# Patient Record
Sex: MALE | Race: WHITE | Employment: FULL TIME | URBAN - METROPOLITAN AREA
[De-identification: names, ages, dates, MRNs, and addresses within clinical notes are randomized per-mention and may not be internally consistent; named-entity substitution may affect disease eponyms.]

---

## 2018-06-23 ENCOUNTER — HOSPITAL ENCOUNTER (EMERGENCY)
Age: 28
Discharge: HOME | End: 2018-06-23
Payer: SELF-PAY

## 2018-06-23 VITALS
DIASTOLIC BLOOD PRESSURE: 76 MMHG | OXYGEN SATURATION: 99 % | SYSTOLIC BLOOD PRESSURE: 125 MMHG | WEIGHT: 150 LBS | HEART RATE: 73 BPM | TEMPERATURE: 98.1 F | HEIGHT: 71 IN | RESPIRATION RATE: 16 BRPM | BODY MASS INDEX: 21 KG/M2

## 2018-06-23 DIAGNOSIS — S61.213A LACERATION OF LEFT MIDDLE FINGER WITHOUT FOREIGN BODY WITHOUT DAMAGE TO NAIL, INITIAL ENCOUNTER: Primary | ICD-10-CM

## 2018-06-23 PROCEDURE — 77030002986 HC SUT PROL J&J -A

## 2018-06-23 PROCEDURE — 90471 IMMUNIZATION ADMIN: CPT

## 2018-06-23 PROCEDURE — 77030002916 HC SUT ETHLN J&J -A

## 2018-06-23 PROCEDURE — 99282 EMERGENCY DEPT VISIT SF MDM: CPT

## 2018-06-23 PROCEDURE — 75810000293 HC SIMP/SUPERF WND  RPR

## 2018-06-23 PROCEDURE — 90715 TDAP VACCINE 7 YRS/> IM: CPT

## 2018-06-23 PROCEDURE — 74011250636 HC RX REV CODE- 250/636

## 2018-06-23 RX ADMIN — TETANUS TOXOID, REDUCED DIPHTHERIA TOXOID AND ACELLULAR PERTUSSIS VACCINE, ADSORBED 0.5 ML: 5; 2.5; 8; 8; 2.5 SUSPENSION INTRAMUSCULAR at 03:11

## 2018-06-23 NOTE — DISCHARGE INSTRUCTIONS
Cuts: Care Instructions  Your Care Instructions  A cut can happen anywhere on your body. Stitches, staples, skin adhesives, or pieces of tape called Steri-Strips are sometimes used to keep the edges of a cut together and help it heal. Steri-Strips can be used by themselves or with stitches or staples. Sometimes cuts are left open. If the cut went deep and through the skin, the doctor may have closed the cut in two layers. A deeper layer of stitches brings the deep part of the cut together. These stitches will dissolve and don't need to be removed. The upper layer closure, which could be stitches, staples, Steri-Strips, or adhesive, is what you see on the cut. A cut is often covered by a bandage. The doctor has checked you carefully, but problems can develop later. If you notice any problems or new symptoms, get medical treatment right away. Follow-up care is a key part of your treatment and safety. Be sure to make and go to all appointments, and call your doctor if you are having problems. It's also a good idea to know your test results and keep a list of the medicines you take. How can you care for yourself at home? If a cut is open or closed  · Prop up the sore area on a pillow anytime you sit or lie down during the next 3 days. Try to keep it above the level of your heart. This will help reduce swelling. · Keep the cut dry for the first 24 to 48 hours. After this, you can shower if your doctor okays it. Pat the cut dry. · Don't soak the cut, such as in a bathtub. Your doctor will tell you when it's safe to get the cut wet. · After the first 24 to 48 hours, clean the cut with soap and water 2 times a day unless your doctor gives you different instructions. ¨ Don't use hydrogen peroxide or alcohol, which can slow healing. ¨ You may cover the cut with a thin layer of petroleum jelly and a nonstick bandage.   ¨ If the doctor put a bandage over the cut, put on a new bandage after cleaning the cut or if the bandage gets wet or dirty. · Avoid any activity that could cause your cut to reopen. · Be safe with medicines. Read and follow all instructions on the label. ¨ If the doctor gave you a prescription medicine for pain, take it as prescribed. ¨ If you are not taking a prescription pain medicine, ask your doctor if you can take an over-the-counter medicine. If the cut is closed with stitches, staples, or Steri-Strips  · Follow the above instructions for open or closed cuts. · Do not remove the stitches or staples on your own. Your doctor will tell you when to come back to have the stitches or staples removed. · Leave Steri-Strips on until they fall off. If the cut is closed with a skin adhesive  · Follow the above instructions for open or closed cuts. · Leave the skin adhesive on your skin until it falls off on its own. This may take 5 to 10 days. · Do not scratch, rub, or pick at the adhesive. · Do not put the sticky part of a bandage directly on the adhesive. · Do not put any kind of ointment, cream, or lotion over the area. This can make the adhesive fall off too soon. Do not use hydrogen peroxide or alcohol, which can slow healing. When should you call for help? Call your doctor now or seek immediate medical care if:  ? · You have new pain, or your pain gets worse. ? · The skin near the cut is cold or pale or changes color. ? · You have tingling, weakness, or numbness near the cut.   ? · The cut starts to bleed, and blood soaks through the bandage. Oozing small amounts of blood is normal.   ? · You have trouble moving the area near the cut.   ? · You have symptoms of infection, such as:  ¨ Increased pain, swelling, warmth, or redness around the cut. ¨ Red streaks leading from the cut. ¨ Pus draining from the cut. ¨ A fever. ? Watch closely for changes in your health, and be sure to contact your doctor if:  ? · The cut reopens. ? · You do not get better as expected.    Where can you learn more? Go to http://keri-estrellita.info/. Enter M735 in the search box to learn more about \"Cuts: Care Instructions. \"  Current as of: March 20, 2017  Content Version: 11.4  © 8309-0128 [employer]. Care instructions adapted under license by [employer] (which disclaims liability or warranty for this information). If you have questions about a medical condition or this instruction, always ask your healthcare professional. [address] any warranty or liability for your use of this information.

## 2018-06-23 NOTE — ED NOTES
I have reviewed discharge instructions with the patient. The patient verbalized understanding. Patient left ED via Discharge Method: ambulatory to Home with (female visitor). Opportunity for questions and clarification provided. Patient given 0 scripts. To continue your aftercare when you leave the hospital, you may receive an automated call from our care team to check in on how you are doing. This is a free service and part of our promise to provide the best care and service to meet your aftercare needs.  If you have questions, or wish to unsubscribe from this service please call [phone]. Thank you for Choosing our [facility] Emergency Department.

## 2018-06-23 NOTE — ED PROVIDER NOTES
HPI Comments: 52-year-old male was at work at [employer] when he picked a sharp object lacerated his left middle finger at the DIP. Patient's laceration is quite superficial    Patient is a 25 y.o. male presenting with skin laceration. The history is provided by the patient. Laceration    The incident occurred less than 1 hour ago. The laceration is 1 cm in size. The injury mechanism is a metal edge. Foreign body present: no.        History reviewed. No pertinent past medical history. History reviewed. No pertinent surgical history. History reviewed. No pertinent family history. Social History     Social History    Marital status:      Spouse name: N/A    Number of children: N/A    Years of education: N/A     Occupational History    Not on file. Social History Main Topics    Smoking status: Former Smoker    Smokeless tobacco: Never Used    Alcohol use No    Drug use: No    Sexual activity: Not on file     Other Topics Concern    Not on file     Social History Narrative         ALLERGIES: Review of patient's allergies indicates no known allergies. Review of Systems   Constitutional: Negative. Negative for activity change. HENT: Negative. Eyes: Negative. Respiratory: Negative. Cardiovascular: Negative. Gastrointestinal: Negative. Genitourinary: Negative. Musculoskeletal: Negative. Skin: Negative. Neurological: Negative. Psychiatric/Behavioral: Negative. All other systems reviewed and are negative. Vitals:    06/23/18 0050   BP: 125/76   Pulse: 73   Resp: 16   Temp: 98.1 °F (36.7 °C)   SpO2: 99%   Weight: 68 kg (150 lb)   Height: 5' 11\" (1.803 m)            Physical Exam   Constitutional: He is oriented to person, place, and time. He appears well-developed and well-nourished. No distress. HENT:   Head: Normocephalic and atraumatic.    Right Ear: External ear normal.   Left Ear: External ear normal.   Nose: Nose normal.   Mouth/Throat: Oropharynx is clear and moist. No oropharyngeal exudate. Eyes: Conjunctivae and EOM are normal. Pupils are equal, round, and reactive to light. Right eye exhibits no discharge. Left eye exhibits no discharge. No scleral icterus. Neck: Normal range of motion. Neck supple. No JVD present. No tracheal deviation present. Cardiovascular: Normal rate, regular rhythm and intact distal pulses. Pulmonary/Chest: Effort normal and breath sounds normal. No stridor. No respiratory distress. He has no wheezes. He exhibits no tenderness. Abdominal: Soft. There is no tenderness. Musculoskeletal: Normal range of motion. He exhibits no edema or tenderness. Left hand: He exhibits laceration. Hands:  Neurological: He is alert and oriented to person, place, and time. No cranial nerve deficit. Skin: Skin is warm and dry. No rash noted. He is not diaphoretic. No erythema. No pallor. Psychiatric: He has a normal mood and affect. His behavior is normal. Thought content normal.   Nursing note and vitals reviewed. MDM  Number of Diagnoses or Management Options  Diagnosis management comments: Assessment laceration to the DIP middle finger left hand. Francisco Begin repair        ED Course       Wound Repair  Date/Time: 6/23/2018 3:00 AM  Performed by: attendingPreparation: skin prepped with Betadine  Pre-procedure re-eval: Immediately prior to the procedure, the patient was reevaluated and found suitable for the planned procedure and any planned medications. Time out: Immediately prior to the procedure a time out was called to verify the correct patient, procedure, equipment, staff and marking as appropriate. .  Location details: left long finger  Wound length:2.5 cm or less    Anesthesia:  Local Anesthetic: lidocaine 1% without epinephrine  Foreign bodies: no foreign bodies  Irrigation solution: saline  Irrigation method: syringe  Debridement: minimal  Skin closure: 4-0 nylon and Prolene  Number of sutures: 3  Approximation: close  Dressing: antibiotic ointment  My total time at bedside, performing this procedure was 1-15 minutes.

## 2018-07-03 ENCOUNTER — HOSPITAL ENCOUNTER (EMERGENCY)
Age: 28
Discharge: HOME | End: 2018-07-03
Attending: EMERGENCY MEDICINE
Payer: SELF-PAY

## 2018-07-03 VITALS
RESPIRATION RATE: 16 BRPM | SYSTOLIC BLOOD PRESSURE: 111 MMHG | DIASTOLIC BLOOD PRESSURE: 69 MMHG | HEART RATE: 71 BPM | OXYGEN SATURATION: 99 % | WEIGHT: 152 LBS | BODY MASS INDEX: 21.76 KG/M2 | TEMPERATURE: 98.2 F | HEIGHT: 70 IN

## 2018-07-03 PROCEDURE — 75810000275 HC EMERGENCY DEPT VISIT NO LEVEL OF CARE: Performed by: EMERGENCY MEDICINE

## 2018-07-03 NOTE — ED TRIAGE NOTES
Pt has three sutures to be removed in left middle finger. States they have been in place for ten days. No redness or swelling to area.

## 2018-07-03 NOTE — ED NOTES
I have reviewed discharge instructions with the patient. The patient verbalized understanding. Patient left ED via Discharge Method: ambulatory to Home with self). Opportunity for questions and clarification provided. Patient given 0 scripts. To continue your aftercare when you leave the hospital, you may receive an automated call from our care team to check in on how you are doing. This is a free service and part of our promise to provide the best care and service to meet your aftercare needs.  If you have questions, or wish to unsubscribe from this service please call [phone]. Thank you for Choosing our [facility] Emergency Department.

## 2022-01-22 ENCOUNTER — HOSPITAL ENCOUNTER (EMERGENCY)
Age: 32
Discharge: HOME | End: 2022-01-22
Attending: EMERGENCY MEDICINE
Payer: COMMERCIAL

## 2022-01-22 VITALS
HEIGHT: 70 IN | WEIGHT: 150 LBS | TEMPERATURE: 100.8 F | RESPIRATION RATE: 18 BRPM | SYSTOLIC BLOOD PRESSURE: 135 MMHG | BODY MASS INDEX: 21.47 KG/M2 | HEART RATE: 116 BPM | DIASTOLIC BLOOD PRESSURE: 76 MMHG | OXYGEN SATURATION: 99 %

## 2022-01-22 DIAGNOSIS — Z20.822 CLOSE EXPOSURE TO COVID-19 VIRUS: Primary | ICD-10-CM

## 2022-01-22 LAB — SARS-COV-2, COV2: NORMAL

## 2022-01-22 PROCEDURE — 99283 EMERGENCY DEPT VISIT LOW MDM: CPT

## 2022-01-22 PROCEDURE — U0005 INFEC AGEN DETEC AMPLI PROBE: HCPCS

## 2022-01-22 PROCEDURE — 74011250637 HC RX REV CODE- 250/637: Performed by: PHYSICIAN ASSISTANT

## 2022-01-22 RX ORDER — ACETAMINOPHEN 500 MG
1000 TABLET ORAL
Status: COMPLETED | OUTPATIENT
Start: 2022-01-22 | End: 2022-01-22

## 2022-01-22 RX ADMIN — ACETAMINOPHEN 1000 MG: 500 TABLET, FILM COATED ORAL at 21:49

## 2022-01-22 NOTE — Clinical Note
[address] [facility] EMERGENCY DEPT   [facility]  [address] [place] [address] [phone]    Work/School Note    Date: 1/22/2022     To Whom It May concern:    Oren Polk was evaluated by the following provider(s):  No providers found. COVID19 virus is suspected. Per the CDC guidelines we recommend home isolation until the following conditions are all met:    1. At least five days have passed since symptoms first appeared and/or had a close exposure,   2. After home isolation for five days, wearing a mask around others for the next five days,  3. At least 24 have passed since last fever without the use of fever-reducing medications and  4.  Symptoms (eg cough, shortness of breath) have improved      Sincerely,          Fredy Elena

## 2022-01-23 LAB
SARS-COV-2, COV2: DETECTED
SPECIMEN SOURCE, FCOV2M: ABNORMAL

## 2022-01-23 NOTE — ED TRIAGE NOTES
Pt arrives with concerns for having covid. States body aches and loss of smell and taste.  States symptoms started this AM.

## 2022-01-23 NOTE — DISCHARGE INSTRUCTIONS
Your Covid test is pending on discharge. Check [facility] for these results. Per most recently updated CDC guidelines, you should self quarantine for 5 days. On day 6 if you are asymptomatic you may wear a mask while being around others, however if you still have any COVID-19 symptoms you should continue to self quarantine for the full 10 days. Alternate Tylenol and Motrin for fever or body aches. You may take Tylenol every 4 hours as needed. You may take Motrin every 6 hours as needed. Increase oral hydration at home. Follow up with your PCP in the next 1-2 days if no improvement. Return to the ER for any new or worsening symptoms.

## 2022-01-23 NOTE — ED PROVIDER NOTES
32 yo male presents today for body aches, fever, chills that started today. Denies any cough, shortness of breath, chest pain. Denies any abdominal complaints. Exposed to covid-19 at work, patient is not COVID vaccinated. Denies recent hospitalizations or surgeries. Denies any underlying past medical history           No past medical history on file. No past surgical history on file. Family History:   Problem Relation Age of Onset    Cancer Mother         breast cancer    Bipolar Disorder Mother     Cancer Father     Diabetes Father        Social History     Socioeconomic History    Marital status: SINGLE     Spouse name: Not on file    Number of children: Not on file    Years of education: Not on file    Highest education level: Not on file   Occupational History    Not on file   Tobacco Use    Smoking status: Current Every Day Smoker     Packs/day: 3.00     Years: 15.00     Pack years: 45.00    Smokeless tobacco: Current User   Substance and Sexual Activity    Alcohol use: No    Drug use: No    Sexual activity: Yes   Other Topics Concern    Not on file   Social History Narrative    Not on file     Social Determinants of Health     Financial Resource Strain:     Difficulty of Paying Living Expenses: Not on file   Food Insecurity:     Worried About Running Out of Food in the Last Year: Not on file    Shania of Food in the Last Year: Not on file   Transportation Needs:     Lack of Transportation (Medical): Not on file    Lack of Transportation (Non-Medical):  Not on file   Physical Activity:     Days of Exercise per Week: Not on file    Minutes of Exercise per Session: Not on file   Stress:     Feeling of Stress : Not on file   Social Connections:     Frequency of Communication with Friends and Family: Not on file    Frequency of Social Gatherings with Friends and Family: Not on file    Attends Taoist Services: Not on file    Active Member of Clubs or Organizations: Not on file    Attends Club or Organization Meetings: Not on file    Marital Status: Not on file   Intimate Partner Violence:     Fear of Current or Ex-Partner: Not on file    Emotionally Abused: Not on file    Physically Abused: Not on file    Sexually Abused: Not on file   Housing Stability:     Unable to Pay for Housing in the Last Year: Not on file    Number of [place] in the Last Year: Not on file    Unstable Housing in the Last Year: Not on file         ALLERGIES: Cat dander, Dog dander, Mold, and Pollen extracts    Review of Systems   Constitutional: Positive for chills, fatigue and fever. HENT: Negative for congestion, ear pain, sore throat, trouble swallowing and voice change. Respiratory: Negative for cough, chest tightness and shortness of breath. Cardiovascular: Negative for chest pain. Gastrointestinal: Negative for abdominal pain, diarrhea, nausea and vomiting. Musculoskeletal: Positive for myalgias. Negative for neck pain and neck stiffness. Skin: Negative for rash. Neurological: Positive for headaches. Negative for dizziness, weakness and light-headedness. Vitals:    01/22/22 2142   BP: 135/76   Pulse: (!) 116   Resp: 18   Temp: (!) 100.8 °F (38.2 °C)   SpO2: 99%   Weight: 68 kg (150 lb)   Height: 5' 10\" (1.778 m)            Physical Exam  Vitals and nursing note reviewed. Constitutional:       General: He is not in acute distress. Appearance: Normal appearance. HENT:      Head: Normocephalic and atraumatic. Right Ear: Tympanic membrane and ear canal normal.      Left Ear: Tympanic membrane and ear canal normal.      Mouth/Throat:      Mouth: Mucous membranes are moist.      Pharynx: Oropharynx is clear. Uvula midline. No pharyngeal swelling, oropharyngeal exudate or posterior oropharyngeal erythema. Tonsils: No tonsillar exudate or tonsillar abscesses.    Eyes:      Conjunctiva/sclera: Conjunctivae normal.   Cardiovascular:      Rate and Rhythm: Normal rate and regular rhythm. Heart sounds: No murmur heard. No friction rub. No gallop. Pulmonary:      Effort: Pulmonary effort is normal. No respiratory distress. Breath sounds: No wheezing or rales. Musculoskeletal:      Cervical back: Normal range of motion and neck supple. Skin:     General: Skin is warm and dry. Capillary Refill: Capillary refill takes less than 2 seconds. Neurological:      Mental Status: He is alert. MDM  Number of Diagnoses or Management Options  Close exposure to COVID-19 virus  Diagnosis management comments: 53-year-old well-appearing male presenting today for onset of body aches, loss of taste and smell, fever with known COVID exposure. Patient is not COVID vaccinated. PCR COVID test was performed and is currently pending on discharge. [facility] Otherwise we discussed symptomatic treatment at home with alternating Motrin and Tylenol for fever or body aches, increase oral hydration, and if COVID test is positive he will need to self quarantine per CDC guidelines. Kali Mckenna, [address]; 1/23/2022 @1:28 AM Voice dictation software was used during the making of this note. This software is not perfect and grammatical and other typographical errors may be present.   This note has not been proofread for errors.  ====================================           Procedures

## 2022-01-23 NOTE — ED NOTES
I have reviewed discharge instructions with the patient. The patient verbalized understanding. Patient left ED via Discharge Method: ambulatory to Home with friend. Opportunity for questions and clarification provided. Patient given 0 scripts. To continue your aftercare when you leave the hospital, you may receive an automated call from our care team to check in on how you are doing. This is a free service and part of our promise to provide the best care and service to meet your aftercare needs.  If you have questions, or wish to unsubscribe from this service please call [phone]. Thank you for Choosing our [facility] Emergency Department.

## 2022-06-08 RX ORDER — OMEPRAZOLE 40 MG/1
CAPSULE, DELAYED RELEASE ORAL
Qty: 7 CAPSULE | Refills: 0 | Status: SHIPPED | OUTPATIENT
Start: 2022-06-08 | End: 2022-07-11

## 2022-07-11 RX ORDER — OMEPRAZOLE 40 MG/1
CAPSULE, DELAYED RELEASE ORAL
Qty: 30 CAPSULE | Refills: 0 | Status: SHIPPED | OUTPATIENT
Start: 2022-07-11 | End: 2022-08-10

## 2022-07-12 ENCOUNTER — TELEMEDICINE (OUTPATIENT)
Dept: FAMILY MEDICINE CLINIC | Facility: CLINIC | Age: 32
End: 2022-07-12
Payer: COMMERCIAL

## 2022-07-12 DIAGNOSIS — R10.84 GENERALIZED ABDOMINAL PAIN: Primary | ICD-10-CM

## 2022-07-12 DIAGNOSIS — R10.84 GENERALIZED ABDOMINAL PAIN: ICD-10-CM

## 2022-07-12 DIAGNOSIS — R10.13 POSTPRANDIAL EPIGASTRIC PAIN: ICD-10-CM

## 2022-07-12 DIAGNOSIS — Z11.59 ENCOUNTER FOR HEPATITIS C SCREENING TEST FOR LOW RISK PATIENT: ICD-10-CM

## 2022-07-12 LAB
ALBUMIN SERPL-MCNC: 4 G/DL (ref 3.5–5)
ALBUMIN/GLOB SERPL: 1.3 {RATIO} (ref 1.2–3.5)
ALP SERPL-CCNC: 86 U/L (ref 50–136)
ALT SERPL-CCNC: 23 U/L (ref 12–65)
AST SERPL-CCNC: 13 U/L (ref 15–37)
BASOPHILS # BLD: 0 K/UL (ref 0–0.2)
BASOPHILS NFR BLD: 1 % (ref 0–2)
BILIRUB DIRECT SERPL-MCNC: <0.1 MG/DL
BILIRUB SERPL-MCNC: 0.2 MG/DL (ref 0.2–1.1)
DIFFERENTIAL METHOD BLD: NORMAL
EOSINOPHIL # BLD: 0.2 K/UL (ref 0–0.8)
EOSINOPHIL NFR BLD: 4 % (ref 0.5–7.8)
ERYTHROCYTE [DISTWIDTH] IN BLOOD BY AUTOMATED COUNT: 12.2 % (ref 11.9–14.6)
GLOBULIN SER CALC-MCNC: 3 G/DL (ref 2.3–3.5)
HCT VFR BLD AUTO: 44.1 % (ref 41.1–50.3)
HCV AB SER QL: NONREACTIVE
HGB BLD-MCNC: 14.9 G/DL (ref 13.6–17.2)
IMM GRANULOCYTES # BLD AUTO: 0 K/UL (ref 0–0.5)
IMM GRANULOCYTES NFR BLD AUTO: 0 % (ref 0–5)
LYMPHOCYTES # BLD: 1.9 K/UL (ref 0.5–4.6)
LYMPHOCYTES NFR BLD: 33 % (ref 13–44)
MCH RBC QN AUTO: 30.5 PG (ref 26.1–32.9)
MCHC RBC AUTO-ENTMCNC: 33.8 G/DL (ref 31.4–35)
MCV RBC AUTO: 90.2 FL (ref 79.6–97.8)
MONOCYTES # BLD: 0.7 K/UL (ref 0.1–1.3)
MONOCYTES NFR BLD: 12 % (ref 4–12)
NEUTS SEG # BLD: 2.9 K/UL (ref 1.7–8.2)
NEUTS SEG NFR BLD: 50 % (ref 43–78)
NRBC # BLD: 0 K/UL (ref 0–0.2)
PLATELET # BLD AUTO: 257 K/UL (ref 150–450)
PMV BLD AUTO: 10 FL (ref 9.4–12.3)
PROT SERPL-MCNC: 7 G/DL (ref 6.3–8.2)
RBC # BLD AUTO: 4.89 M/UL (ref 4.23–5.6)
TSH, 3RD GENERATION: 5.54 UIU/ML (ref 0.36–3.74)
WBC # BLD AUTO: 5.8 K/UL (ref 4.3–11.1)

## 2022-07-12 PROCEDURE — 99214 OFFICE O/P EST MOD 30 MIN: CPT | Performed by: FAMILY MEDICINE

## 2022-07-12 RX ORDER — TRAZODONE HYDROCHLORIDE 50 MG/1
TABLET ORAL
Qty: 135 TABLET | Refills: 1 | Status: SHIPPED | OUTPATIENT
Start: 2022-07-12

## 2022-07-12 RX ORDER — DICYCLOMINE HCL 20 MG
20 TABLET ORAL 4 TIMES DAILY
Qty: 120 TABLET | Refills: 5 | Status: SHIPPED | OUTPATIENT
Start: 2022-07-12 | End: 2022-09-19

## 2022-07-12 ASSESSMENT — PATIENT HEALTH QUESTIONNAIRE - PHQ9
SUM OF ALL RESPONSES TO PHQ QUESTIONS 1-9: 0
SUM OF ALL RESPONSES TO PHQ QUESTIONS 1-9: 0
SUM OF ALL RESPONSES TO PHQ9 QUESTIONS 1 & 2: 0
2. FEELING DOWN, DEPRESSED OR HOPELESS: 0
SUM OF ALL RESPONSES TO PHQ QUESTIONS 1-9: 0
1. LITTLE INTEREST OR PLEASURE IN DOING THINGS: 0
SUM OF ALL RESPONSES TO PHQ QUESTIONS 1-9: 0

## 2022-07-12 NOTE — PROGRESS NOTES
Subjective:    Jeet Mcclendon is a 27 y.o. male Jeet Mcclendon, was evaluated through a synchronous (real-time) audio-video encounter. The patient (or guardian if applicable) is aware that this is a billable service, which includes applicable co-pays. This Virtual Visit was conducted with patient's (and/or legal guardian's) consent. The visit was conducted pursuant to the emergency declaration under the [address], [address] [place] [address] and the [employer] Act. Patient identification was verified, and a caregiver was present when appropriate. The patient was located at Home: [address] [facility] [address] [facility]. Provider was located at [place] [address] (Appt Dept): [address] [facility] [address] [facility],  [facility] Tessa Washington presents today essentially no better than he was in late winter. Changing his diet made no difference in his symptoms. After every meal whether it solid, something spicy or something bland he gets abdominal pain and cramping that can last for maybe 30 minutes but it is usually almost immediate. Sometimes he gets nauseated. Has never had any emesis nor diarrhea. States his abdomen does not really cramp. Denies any bright red blood or mucus being present. Normally would never wake him up at night but but if he eats right before he goes to bed it will. Denies any alcohol use, Aleve ibuprofen, or Advil etc. NSAID use. He also is happy with the trazodone but is still waking up in the middle the night and wants to see if he can get that dose increased  Allergies   Allergen Reactions    Dog Epithelium Allergy Skin Test Other (See Comments)    Molds & Smuts Other (See Comments)     There is no problem list on file for this patient.     Objective:    Respirations approximately 16-20 breaths per minute    Constitutional: [x] Appears well-developed and well-nourished [x] No apparent distress          Mental status: [x] Alert and awake  [x] Oriented to person/place/time [x] Able to follow commands    [] Abnormal -     Eyes:   EOM    [x]  Normal    [] Abnormal -   Sclera  [x]  Normal    [] Abnormal -          Discharge [x]  None visible      HENT: [x] Normocephalic, atraumatic    [x] Mouth/Throat: Mucous membranes are moist    External Ears [x] Normal  [] Abnormal -                        Hearing is normal    Neck: [x] No visualized mass [] Abnormal -     Pulmonary/Chest: [x] Respiratory effort normal   [x] No visualized signs of difficulty breathing or respiratory distress         Musculoskeletal:   [x] Normal gait with no signs of ataxia         [x] Normal range of motion of neck            Neurological:        [x] No Facial Asymmetry (Cranial nerve 7 motor function) (limited exam due to video visit)          [x] No gaze palsy                Skin:        [x] No significant exanthematous lesions or discoloration noted on facial skin                  Psychiatric:       [x] Normal Affect        [x] No Hallucinations  Assessment:  1. Generalized abdominal pain    2. Postprandial epigastric pain    3. Encounter for hepatitis C screening test for low risk patient       Plan:   Other medical questions have been addressed/discussed as needed. Medications adjusted as needed. 1. Generalized abdominal pain  -     CBC with Auto Differential; Future  -     TSH; Future  -     Hepatic Function Panel; Future  -     US ABDOMEN COMPLETE; Future  -     dicyclomine (BENTYL) 20 MG tablet; Take 1 tablet by mouth 4 times daily, Disp-120 tablet, R-5Normal  -     H. Pylori Breath Test; Future  2. Postprandial epigastric pain  -     CBC with Auto Differential; Future  -     TSH; Future  -     Hepatic Function Panel; Future  -     US ABDOMEN COMPLETE; Future  -     dicyclomine (BENTYL) 20 MG tablet; Take 1 tablet by mouth 4 times daily, Disp-120 tablet, R-5Normal  -     H. Pylori Breath Test; Future  3.  Encounter for hepatitis C screening test for low risk patient  -     Hepatitis C Antibody; Future      Patient needs some lab work drawn. He is to make sure he is off the Prilosec for 14 days before he test for the H. pylori breath test.  Other lab work as noted. We will start with an abdominal ultrasound as well. Begin Bentyl for his stomach issues in the event that this is simply IBS. Regarding his sleep we have agreed to attempt a trial at 1-1/2 50 mg trazodone's a day for the sleep  Followup:  According to instructions given today  No follow-up provider specified.

## 2022-07-18 ENCOUNTER — HOSPITAL ENCOUNTER (OUTPATIENT)
Dept: ULTRASOUND IMAGING | Age: 32
Discharge: HOME | End: 2022-07-21
Payer: COMMERCIAL

## 2022-07-18 DIAGNOSIS — R10.13 POSTPRANDIAL EPIGASTRIC PAIN: ICD-10-CM

## 2022-07-18 DIAGNOSIS — R10.84 GENERALIZED ABDOMINAL PAIN: ICD-10-CM

## 2022-07-18 PROCEDURE — 76700 US EXAM ABDOM COMPLETE: CPT

## 2022-08-10 RX ORDER — OMEPRAZOLE 40 MG/1
CAPSULE, DELAYED RELEASE ORAL
Qty: 90 CAPSULE | Refills: 1 | Status: SHIPPED | OUTPATIENT
Start: 2022-08-10

## 2022-09-12 ENCOUNTER — NURSE ONLY (OUTPATIENT)
Dept: FAMILY MEDICINE CLINIC | Facility: CLINIC | Age: 32
End: 2022-09-12

## 2022-09-12 DIAGNOSIS — E03.8 SUBCLINICAL HYPOTHYROIDISM: ICD-10-CM

## 2022-09-12 DIAGNOSIS — E03.8 SUBCLINICAL HYPOTHYROIDISM: Primary | ICD-10-CM

## 2022-09-14 LAB
T4 FREE SERPL-MCNC: 0.9 NG/DL (ref 0.78–1.46)
TSH, 3RD GENERATION: 10.3 UIU/ML (ref 0.36–3.74)

## 2022-09-17 LAB — THYROPEROXIDASE AB SERPL-ACNC: 254 IU/ML (ref 0–34)

## 2022-09-19 ENCOUNTER — OFFICE VISIT (OUTPATIENT)
Dept: FAMILY MEDICINE CLINIC | Facility: CLINIC | Age: 32
End: 2022-09-19
Payer: COMMERCIAL

## 2022-09-19 VITALS
OXYGEN SATURATION: 98 % | WEIGHT: 152 LBS | HEART RATE: 75 BPM | SYSTOLIC BLOOD PRESSURE: 100 MMHG | BODY MASS INDEX: 21.81 KG/M2 | DIASTOLIC BLOOD PRESSURE: 70 MMHG

## 2022-09-19 DIAGNOSIS — E06.3 HYPOTHYROIDISM DUE TO HASHIMOTO'S THYROIDITIS: Primary | ICD-10-CM

## 2022-09-19 DIAGNOSIS — E03.8 HYPOTHYROIDISM DUE TO HASHIMOTO'S THYROIDITIS: Primary | ICD-10-CM

## 2022-09-19 PROBLEM — R76.8 ANTI-TPO ANTIBODIES PRESENT: Status: ACTIVE | Noted: 2022-09-19

## 2022-09-19 PROBLEM — R76.8 ANTI-TPO ANTIBODIES PRESENT: Status: RESOLVED | Noted: 2022-09-19 | Resolved: 2022-09-19

## 2022-09-19 PROCEDURE — 99213 OFFICE O/P EST LOW 20 MIN: CPT | Performed by: FAMILY MEDICINE

## 2022-09-19 RX ORDER — LEVOTHYROXINE SODIUM 0.07 MG/1
75 TABLET ORAL DAILY
Qty: 90 TABLET | Refills: 0 | Status: SHIPPED | OUTPATIENT
Start: 2022-09-19

## 2022-09-19 ASSESSMENT — PATIENT HEALTH QUESTIONNAIRE - PHQ9
SUM OF ALL RESPONSES TO PHQ QUESTIONS 1-9: 0
SUM OF ALL RESPONSES TO PHQ9 QUESTIONS 1 & 2: 0
SUM OF ALL RESPONSES TO PHQ QUESTIONS 1-9: 0
2. FEELING DOWN, DEPRESSED OR HOPELESS: 0
1. LITTLE INTEREST OR PLEASURE IN DOING THINGS: 0
SUM OF ALL RESPONSES TO PHQ QUESTIONS 1-9: 0
SUM OF ALL RESPONSES TO PHQ QUESTIONS 1-9: 0

## 2022-09-19 NOTE — PROGRESS NOTES
Subjective:   Chago Hill is a 27 y.o. male presents today for a discussion of their new onset hypothyroidism. His repeat TSH came back elevated and his TPO antibodies were positive. He denies any knowledge of any family members with hypothyroidism. Perhaps has had some fatigue, constipation, no skin or hair changes noted. Allergies   Allergen Reactions    Dog Epithelium Allergy Skin Test Other (See Comments)    Molds & Smuts Other (See Comments)     PMH, MEDS reviewed    Objective:  General- Pleasant and no distress  Psych- alert and oriented to person, place and time  Mood and affect are appropriate to situation  Musculoskeletal - Gait and station examination reveals mid-position with no abnormalities. Neurological- grossly intact. Most of today's visit is spent counseling with review of symptoms, disposition, prognosis and treatment plan options related to the diagnosis of their new onset hypothyroidism. Assessment:   1. Hypothyroidism due to Hashimoto's thyroiditis      Plan: It is very important to take your thyroid medication on an empty stomach WITH WATER ONLY, not juice, not mil, and as the first medicine in the morning (unless you have been told to take it at bedtime on an empty stomach). You should take ton other medication or food for 1 hr after taking your thyroid medicine. Therefore, most people take their thyroid medicine immediately after awakening in the morning. You can take all other SUPPLEMENTS  (vitamins, minerals etc.) about 3 hours later, or better yet with lunch or dinner. 1. Hypothyroidism due to Hashimoto's thyroiditis  -     levothyroxine (SYNTHROID) 75 MCG tablet; Take 1 tablet by mouth daily, Disp-90 tablet, R-0Normal    Followup:  Return for an early morning thyroid stimulating hormone level in approximately 8 weeks,  Don't take your medication that morning before coming in. Return 8 wks lab only tsh, free t4.

## 2022-10-14 RX ORDER — TRAZODONE HYDROCHLORIDE 50 MG/1
TABLET ORAL
Qty: 90 TABLET | Refills: 0 | OUTPATIENT
Start: 2022-10-14

## 2022-11-11 DIAGNOSIS — E03.8 HYPOTHYROIDISM DUE TO HASHIMOTO'S THYROIDITIS: Primary | ICD-10-CM

## 2022-11-11 DIAGNOSIS — E06.3 HYPOTHYROIDISM DUE TO HASHIMOTO'S THYROIDITIS: Primary | ICD-10-CM

## 2022-11-14 DIAGNOSIS — E03.8 HYPOTHYROIDISM DUE TO HASHIMOTO'S THYROIDITIS: ICD-10-CM

## 2022-11-14 DIAGNOSIS — E06.3 HYPOTHYROIDISM DUE TO HASHIMOTO'S THYROIDITIS: ICD-10-CM

## 2022-11-14 LAB
T4 FREE SERPL-MCNC: 0.8 NG/DL (ref 0.78–1.46)
TSH, 3RD GENERATION: 2.28 UIU/ML (ref 0.36–3.74)

## 2022-11-15 ENCOUNTER — TELEPHONE (OUTPATIENT)
Dept: FAMILY MEDICINE CLINIC | Facility: CLINIC | Age: 32
End: 2022-11-15

## 2022-11-15 DIAGNOSIS — E06.3 HYPOTHYROIDISM DUE TO HASHIMOTO'S THYROIDITIS: ICD-10-CM

## 2022-11-15 DIAGNOSIS — E03.8 HYPOTHYROIDISM DUE TO HASHIMOTO'S THYROIDITIS: ICD-10-CM

## 2022-11-15 RX ORDER — LEVOTHYROXINE SODIUM 0.07 MG/1
75 TABLET ORAL DAILY
Qty: 90 TABLET | Refills: 3 | Status: SHIPPED | OUTPATIENT
Start: 2022-11-15

## 2022-11-15 NOTE — TELEPHONE ENCOUNTER
----- Message from Ching Garces MD sent at 11/15/2022  7:50 AM EST -----  Thyroid lab is perfect. Continue current dose and recheck TSH in a year. Refill thyroid med if needed and only enough to coincide with the expiration of their other medications. If this is their only medication, then a year prescription would be fine.

## 2022-12-05 RX ORDER — OMEPRAZOLE 40 MG/1
CAPSULE, DELAYED RELEASE ORAL
Qty: 90 CAPSULE | Refills: 1 | Status: SHIPPED | OUTPATIENT
Start: 2022-12-05

## 2022-12-05 NOTE — TELEPHONE ENCOUNTER
----- Message from Philliphardeep Christine sent at 12/5/2022 11:58 AM EST -----  Subject: Refill Request    QUESTIONS  Name of Medication? omeprazole (PRILOSEC) 40 MG delayed release capsule  Patient-reported dosage and instructions? 40 mg, once a day  How many days do you have left? 0  Preferred Pharmacy? [id number]  Pharmacy phone number (if available)? [phone]  Additional Information for Provider? Patient is requesting his normal   90-day supply of this medication. Last time, only 3 pills were dispensed   and he was puzzled. Please call to confirm refill.  ---------------------------------------------------------------------------  --------------  CALL BACK INFO  What is the best way for the office to contact you? OK to leave message on   voicemail  Preferred Call Back Phone Number? [phone]  ---------------------------------------------------------------------------  --------------  SCRIPT ANSWERS  Relationship to Patient?  Self

## 2022-12-12 RX ORDER — TRAZODONE HYDROCHLORIDE 50 MG/1
TABLET ORAL
Qty: 135 TABLET | Refills: 1 | Status: SHIPPED | OUTPATIENT
Start: 2022-12-12

## 2022-12-12 NOTE — TELEPHONE ENCOUNTER
Patient requests [facility] [place].  He has 10 pills left

## 2023-02-20 ENCOUNTER — OFFICE VISIT (OUTPATIENT)
Dept: FAMILY MEDICINE CLINIC | Facility: CLINIC | Age: 33
End: 2023-02-20
Payer: COMMERCIAL

## 2023-02-20 VITALS
OXYGEN SATURATION: 98 % | HEIGHT: 70 IN | BODY MASS INDEX: 21.47 KG/M2 | WEIGHT: 150 LBS | DIASTOLIC BLOOD PRESSURE: 67 MMHG | HEART RATE: 60 BPM | SYSTOLIC BLOOD PRESSURE: 104 MMHG

## 2023-02-20 DIAGNOSIS — M25.541 ARTHRALGIA OF RIGHT HAND: Primary | ICD-10-CM

## 2023-02-20 DIAGNOSIS — M19.049 OSTEOARTHRITIS OF METACARPOPHALANGEAL (MCP) JOINT: ICD-10-CM

## 2023-02-20 PROCEDURE — 99213 OFFICE O/P EST LOW 20 MIN: CPT | Performed by: FAMILY MEDICINE

## 2023-02-20 SDOH — ECONOMIC STABILITY: INCOME INSECURITY: HOW HARD IS IT FOR YOU TO PAY FOR THE VERY BASICS LIKE FOOD, HOUSING, MEDICAL CARE, AND HEATING?: NOT HARD AT ALL

## 2023-02-20 SDOH — ECONOMIC STABILITY: FOOD INSECURITY: WITHIN THE PAST 12 MONTHS, YOU WORRIED THAT YOUR FOOD WOULD RUN OUT BEFORE YOU GOT MONEY TO BUY MORE.: NEVER TRUE

## 2023-02-20 SDOH — ECONOMIC STABILITY: FOOD INSECURITY: WITHIN THE PAST 12 MONTHS, THE FOOD YOU BOUGHT JUST DIDN'T LAST AND YOU DIDN'T HAVE MONEY TO GET MORE.: NEVER TRUE

## 2023-02-20 SDOH — ECONOMIC STABILITY: HOUSING INSECURITY
IN THE LAST 12 MONTHS, WAS THERE A TIME WHEN YOU DID NOT HAVE A STEADY PLACE TO SLEEP OR SLEPT IN A SHELTER (INCLUDING NOW)?: NO

## 2023-02-20 ASSESSMENT — PATIENT HEALTH QUESTIONNAIRE - PHQ9
SUM OF ALL RESPONSES TO PHQ9 QUESTIONS 1 & 2: 0
SUM OF ALL RESPONSES TO PHQ QUESTIONS 1-9: 0
1. LITTLE INTEREST OR PLEASURE IN DOING THINGS: 0
SUM OF ALL RESPONSES TO PHQ QUESTIONS 1-9: 0
2. FEELING DOWN, DEPRESSED OR HOPELESS: 0

## 2023-02-20 NOTE — PROGRESS NOTES
Subjective:   Tavia Figueroa is a 26 y.o. male presents today for pain in right middle to MCP joint areas. Seems to be worse with certain repetitive acts at work but sometimes this comes randomly. Once occurred while opening a safe, another time occurred while getting a shirt up off of a . Severe quick pain. This been no acute trauma. Has been going on for about 2 weeks. Allergies   Allergen Reactions    Dog Epithelium Allergy Skin Test Other (See Comments)    Molds & Smuts Other (See Comments)     PMH, MEDS reviewed     Objective:   Blood pressure 104/67, pulse 60, height 5' 10\" (1.778 m), weight 150 lb (68 kg), SpO2 98 %. General- Pleasant and no distress  Psych- alert and oriented to person, place and time  Mood and affect are appropriate to situation  Musculoskeletal - Gait and station examination reveals mid-position with no abnormalities. Neurological- grossly intact. Right hand is examined no soft tissue or bony abnormalities are noted on inspection or palpation although he is mainly tender at the MCP joint area of middle finger. Range of motion is fine and I can appreciate no tendon mass or swelling  Assessment/Plan:     1. Arthralgia of right hand    2. Osteoarthritis of metacarpophalangeal (MCP) joint         1. Arthralgia of right hand  -     diclofenac sodium (VOLTAREN) 1 % GEL; Apply 2 g topically 4 times daily, Topical, 4 TIMES DAILY Starting Mon 2/20/2023, Disp-50 g, R-4, Normal  2. Osteoarthritis of metacarpophalangeal (MCP) joint  -     diclofenac sodium (VOLTAREN) 1 % GEL; Apply 2 g topically 4 times daily, Topical, 4 TIMES DAILY Starting Mon 2/20/2023, Disp-50 g, R-4, Normal    These measures along with warm hand soaks/warm heating pad, if no better in 3 months we could get an x-ray  Followup:   Return if symptoms worsen or fail to improve.

## 2023-05-30 RX ORDER — OMEPRAZOLE 40 MG/1
CAPSULE, DELAYED RELEASE ORAL
Qty: 90 CAPSULE | Refills: 0 | OUTPATIENT
Start: 2023-05-30

## 2023-05-31 ENCOUNTER — TELEPHONE (OUTPATIENT)
Dept: FAMILY MEDICINE CLINIC | Facility: CLINIC | Age: 33
End: 2023-05-31

## 2023-05-31 NOTE — TELEPHONE ENCOUNTER
[id number] [facility] states that the pt is out of omeprazole and is requesting a refill on the patient's behalf.

## 2024-01-12 ASSESSMENT — PATIENT HEALTH QUESTIONNAIRE - PHQ9
1. LITTLE INTEREST OR PLEASURE IN DOING THINGS: NOT AT ALL
SUM OF ALL RESPONSES TO PHQ9 QUESTIONS 1 & 2: 1
1. LITTLE INTEREST OR PLEASURE IN DOING THINGS: 0
SUM OF ALL RESPONSES TO PHQ QUESTIONS 1-9: 1
SUM OF ALL RESPONSES TO PHQ QUESTIONS 1-9: 1
2. FEELING DOWN, DEPRESSED OR HOPELESS: SEVERAL DAYS
2. FEELING DOWN, DEPRESSED OR HOPELESS: 1
SUM OF ALL RESPONSES TO PHQ QUESTIONS 1-9: 1
SUM OF ALL RESPONSES TO PHQ QUESTIONS 1-9: 1
SUM OF ALL RESPONSES TO PHQ9 QUESTIONS 1 & 2: 1

## 2024-01-15 ENCOUNTER — OFFICE VISIT (OUTPATIENT)
Dept: FAMILY MEDICINE CLINIC | Facility: CLINIC | Age: 34
End: 2024-01-15
Payer: COMMERCIAL

## 2024-01-15 VITALS
SYSTOLIC BLOOD PRESSURE: 100 MMHG | DIASTOLIC BLOOD PRESSURE: 73 MMHG | RESPIRATION RATE: 16 BRPM | TEMPERATURE: 97.2 F | HEART RATE: 84 BPM | WEIGHT: 167 LBS | BODY MASS INDEX: 23.91 KG/M2 | OXYGEN SATURATION: 99 % | HEIGHT: 70 IN

## 2024-01-15 DIAGNOSIS — E06.3 HYPOTHYROIDISM DUE TO HASHIMOTO'S THYROIDITIS: ICD-10-CM

## 2024-01-15 DIAGNOSIS — E03.8 HYPOTHYROIDISM DUE TO HASHIMOTO'S THYROIDITIS: ICD-10-CM

## 2024-01-15 DIAGNOSIS — K21.9 GASTROESOPHAGEAL REFLUX DISEASE WITHOUT ESOPHAGITIS: ICD-10-CM

## 2024-01-15 DIAGNOSIS — N52.9 ERECTILE DYSFUNCTION, UNSPECIFIED ERECTILE DYSFUNCTION TYPE: ICD-10-CM

## 2024-01-15 DIAGNOSIS — F51.01 PRIMARY INSOMNIA: Primary | ICD-10-CM

## 2024-01-15 PROCEDURE — 99213 OFFICE O/P EST LOW 20 MIN: CPT | Performed by: FAMILY MEDICINE

## 2024-01-15 RX ORDER — TRAZODONE HYDROCHLORIDE 50 MG/1
TABLET ORAL
Qty: 135 TABLET | Refills: 3 | Status: SHIPPED | OUTPATIENT
Start: 2024-01-15

## 2024-01-15 RX ORDER — LEVOTHYROXINE SODIUM 0.07 MG/1
75 TABLET ORAL DAILY
Qty: 90 TABLET | Refills: 3 | Status: SHIPPED | OUTPATIENT
Start: 2024-01-15

## 2024-01-15 RX ORDER — SILDENAFIL 100 MG/1
100 TABLET, FILM COATED ORAL PRN
Qty: 30 TABLET | Refills: 3 | Status: SHIPPED | OUTPATIENT
Start: 2024-01-15

## 2024-01-15 RX ORDER — PANTOPRAZOLE SODIUM 40 MG/1
40 TABLET, DELAYED RELEASE ORAL
Qty: 90 TABLET | Refills: 3 | Status: SHIPPED | OUTPATIENT
Start: 2024-01-15

## 2024-01-15 NOTE — PROGRESS NOTES
Subjective:   Russ Alexandra is a 33 y.o. male presents today for annual thyroid labs and refill however he has been without the Synthroid for about 2 months.  Also needs refills on trazodone.  Also ED, interested in trying viagra    Allergies   Allergen Reactions    Dog Epithelium Allergy Skin Test Other (See Comments)    Molds & Smuts Other (See Comments)     PMH, MEDS reviewed  PHQ-9 Total Score: 1 (1/12/2024  3:15 PM)         Objective:   Blood pressure 100/73, pulse 84, temperature 97.2 °F (36.2 °C), temperature source Temporal, resp. rate 16, height 1.778 m (5' 10\"), weight 75.8 kg (167 lb), SpO2 99 %.  General- Pleasant and no distress  Psych- alert and oriented to person, place and time  Mood and affect are appropriate to situation  Musculoskeletal - Gait and station examination reveals mid-position with no abnormalities.  Neurological- grossly intact.   rrr s mrg.   bcta    Assessment/Plan:     1. Primary insomnia    2. Hypothyroidism due to Hashimoto's thyroiditis    3. Gastroesophageal reflux disease without esophagitis    4. Erectile dysfunction, unspecified erectile dysfunction type         1. Primary insomnia  -     traZODone (DESYREL) 50 MG tablet; Take 1 1/2 nightly, Disp-135 tablet, R-3Normal  2. Hypothyroidism due to Hashimoto's thyroiditis  -     levothyroxine (SYNTHROID) 75 MCG tablet; Take 1 tablet by mouth daily, Disp-90 tablet, R-3Normal  3. Gastroesophageal reflux disease without esophagitis  -     pantoprazole (PROTONIX) 40 MG tablet; Take 1 tablet by mouth every morning (before breakfast), Disp-90 tablet, R-3Normal  4. Erectile dysfunction, unspecified erectile dysfunction type  -     sildenafil (VIAGRA) 100 MG tablet; Take 1 tablet by mouth as needed for Erectile Dysfunction, Disp-30 tablet, R-3Print    Change to pantoprazole since omeprazole did not work.  Things you can do to alleviate reflux are avoiding and limiting peppermint, chocolate, foods high in fat, acidic foods such as

## 2024-04-23 ENCOUNTER — TELEMEDICINE (OUTPATIENT)
Dept: FAMILY MEDICINE CLINIC | Facility: CLINIC | Age: 34
End: 2024-04-23
Payer: COMMERCIAL

## 2024-04-23 DIAGNOSIS — F41.3 OTHER MIXED ANXIETY DISORDERS: Primary | ICD-10-CM

## 2024-04-23 PROCEDURE — 99213 OFFICE O/P EST LOW 20 MIN: CPT | Performed by: FAMILY MEDICINE

## 2024-04-23 NOTE — PROGRESS NOTES
Subjective:      Russ Alexandra is a 34 y.o. male Russ Alexandra, was evaluated through a synchronous (real-time) audio-video encounter. The patient (or guardian if applicable) is aware that this is a billable service, which includes applicable co-pays. This Virtual Visit was conducted with patient's (and/or legal guardian's) consent. Patient identification was verified, and a caregiver was present when appropriate.   The patient was located at Home: [address] [place] [id number]  Provider was located at Facility (Appt Dept): [address] [place] [address] [address]  [place] [address]  Confirm you are appropriately licensed, registered, or certified to deliver care in the state where the patient is located as indicated above. If you are not or unsure, please re-schedule the visit: Yes, I confirm.   No data recorded  Russ wanted to discuss sources of stress and anxiety.  Apparently was diagnosed with ADD many years ago feels like he is having difficulty focusing focusing at work.  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  Becoming more stressful and relations with his boss.    He is undergoing stress with relations with his landlord, and also undergoing stress with relations with his mother.    Allergies   Allergen Reactions    Dog Epithelium (Canis Lupus Familiaris) Other (See Comments)    Molds & Smuts Other (See Comments)     Patient Active Problem List   Diagnosis    Hypothyroidism due to Hashimoto's thyroiditis    Gastroesophageal reflux disease without esophagitis     Objective:      Respirations approximately 16-20 breaths per minute    Constitutional: [x] Appears well-developed and well-nourished [x] No apparent distress          Mental status: [x] Alert and awake  [x] Oriented to person/place/time [x] Able to follow commands    [] Abnormal -     Eyes:   EOM    [x]  Normal    [] Abnormal -

## 2025-01-20 ENCOUNTER — OFFICE VISIT (OUTPATIENT)
Dept: FAMILY MEDICINE CLINIC | Facility: CLINIC | Age: 35
End: 2025-01-20
Payer: COMMERCIAL

## 2025-01-20 VITALS
RESPIRATION RATE: 16 BRPM | TEMPERATURE: 97.5 F | HEIGHT: 70 IN | DIASTOLIC BLOOD PRESSURE: 74 MMHG | OXYGEN SATURATION: 98 % | HEART RATE: 65 BPM | SYSTOLIC BLOOD PRESSURE: 125 MMHG | BODY MASS INDEX: 22.48 KG/M2 | WEIGHT: 157 LBS

## 2025-01-20 DIAGNOSIS — E06.3 HYPOTHYROIDISM DUE TO HASHIMOTO'S THYROIDITIS: Primary | ICD-10-CM

## 2025-01-20 DIAGNOSIS — F51.01 PRIMARY INSOMNIA: ICD-10-CM

## 2025-01-20 PROCEDURE — 99213 OFFICE O/P EST LOW 20 MIN: CPT | Performed by: FAMILY MEDICINE

## 2025-01-20 RX ORDER — CETIRIZINE HYDROCHLORIDE, PSEUDOEPHEDRINE HYDROCHLORIDE 5; 120 MG/1; MG/1
1 TABLET, FILM COATED, EXTENDED RELEASE ORAL 2 TIMES DAILY
COMMUNITY

## 2025-01-20 RX ORDER — LEVOTHYROXINE SODIUM 75 UG/1
75 TABLET ORAL DAILY
Qty: 60 TABLET | Refills: 0 | Status: SHIPPED | OUTPATIENT
Start: 2025-01-20

## 2025-01-20 RX ORDER — ESZOPICLONE 2 MG/1
2 TABLET, FILM COATED ORAL NIGHTLY
Qty: 30 TABLET | Refills: 1 | Status: SHIPPED | OUTPATIENT
Start: 2025-01-20 | End: 2026-01-20

## 2025-01-20 SDOH — ECONOMIC STABILITY: FOOD INSECURITY: WITHIN THE PAST 12 MONTHS, YOU WORRIED THAT YOUR FOOD WOULD RUN OUT BEFORE YOU GOT MONEY TO BUY MORE.: NEVER TRUE

## 2025-01-20 SDOH — ECONOMIC STABILITY: FOOD INSECURITY: WITHIN THE PAST 12 MONTHS, THE FOOD YOU BOUGHT JUST DIDN'T LAST AND YOU DIDN'T HAVE MONEY TO GET MORE.: NEVER TRUE

## 2025-01-20 ASSESSMENT — PATIENT HEALTH QUESTIONNAIRE - PHQ9
SUM OF ALL RESPONSES TO PHQ QUESTIONS 1-9: 0
SUM OF ALL RESPONSES TO PHQ QUESTIONS 1-9: 0
1. LITTLE INTEREST OR PLEASURE IN DOING THINGS: NOT AT ALL
SUM OF ALL RESPONSES TO PHQ QUESTIONS 1-9: 0
2. FEELING DOWN, DEPRESSED OR HOPELESS: NOT AT ALL
SUM OF ALL RESPONSES TO PHQ QUESTIONS 1-9: 0
SUM OF ALL RESPONSES TO PHQ9 QUESTIONS 1 & 2: 0

## 2025-01-20 NOTE — PROGRESS NOTES
Subjective:  Russ Alexandra is a 34 y.o. male presents today for a follow up of their hypothyroidism.  Unfortunately he has been off his Synthroid for about 2 or 3 months.  Would like to get back on that.  Also trazodone did not help for his insomnia at all.  Would like to try something different.  Allergies   Allergen Reactions    Dog Epithelium (Canis Lupus Familiaris) Other (See Comments)    Molds & Smuts Other (See Comments)     PMH, MEDS reviewed  PHQ-9 Total Score: 0 (1/20/2025  1:39 PM)      Lab Results   Component Value Date/Time    TSH 2.280 11/14/2022 03:23 PM    TSH 10.300 09/12/2022 01:24 PM    TSH 5.540 07/12/2022 03:13 PM     Lab Results   Component Value Date    TSH 2.280 11/14/2022         Objective:  Blood pressure 125/74, pulse 65, temperature 97.5 °F (36.4 °C), resp. rate 16, height 1.778 m (5' 10\"), weight 71.2 kg (157 lb), SpO2 98%.  General- pleasant, no distress  Psych- alert and oriented to person, place and time  Mood and affect are appropriate to the visit  rrr s mrg. bcta  Neck is supple without thyromegaly or nodules.    Assessment:   1. Hypothyroidism due to Hashimoto's thyroiditis    2. Primary insomnia        Plan:  We need to get back on the Synthroid and recheck TSH in about 8 weeks as he has been off the medication for 2 or 3 months.  Also, remember, it is very important to take your thyroid medication on an empty stomach WITH WATER ONLY, not juice, not milk, and as the first medicine in the morning (unless you have been told to take it at bedtime on an empty stomach).  You should take no other medication or food for 1 hr after taking your thyroid medicine.  Therefore, most people take their thyroid medicine immediately after awakening in the morning.  You can take all other SUPPLEMENTS  (vitamins, minerals etc.) about 3 hours later, or better yet with lunch or dinner.   Absolutely no efficacy with the trazodone.  Will try Lunesta 2 mg at bedtime.  Belsomra and Dayvigo are

## 2025-01-20 NOTE — PATIENT INSTRUCTIONS
Also, remember, it is very important to take your thyroid medication on an empty stomach WITH WATER ONLY, not juice, not milk, and as the first medicine in the morning (unless you have been told to take it at bedtime on an empty stomach).  You should take no other medication or food for 1 hr after taking your thyroid medicine.  Therefore, most people take their thyroid medicine immediately after awakening in the morning.  You can take all other SUPPLEMENTS  (vitamins, minerals etc.) about 3 hours later, or better yet with lunch or dinner.

## 2025-01-26 DIAGNOSIS — F51.01 PRIMARY INSOMNIA: ICD-10-CM

## 2025-01-27 RX ORDER — TRAZODONE HYDROCHLORIDE 50 MG/1
TABLET ORAL
Qty: 45 TABLET | Refills: 0 | OUTPATIENT
Start: 2025-01-27

## 2025-01-29 ENCOUNTER — APPOINTMENT (OUTPATIENT)
Dept: GENERAL RADIOLOGY | Age: 35
End: 2025-01-29
Payer: COMMERCIAL

## 2025-01-29 ENCOUNTER — HOSPITAL ENCOUNTER (EMERGENCY)
Age: 35
Discharge: HOME | End: 2025-01-29
Attending: EMERGENCY MEDICINE
Payer: COMMERCIAL

## 2025-01-29 VITALS
DIASTOLIC BLOOD PRESSURE: 70 MMHG | SYSTOLIC BLOOD PRESSURE: 135 MMHG | OXYGEN SATURATION: 98 % | HEART RATE: 80 BPM | TEMPERATURE: 98 F | RESPIRATION RATE: 16 BRPM

## 2025-01-29 DIAGNOSIS — R04.2 HEMOPTYSIS: Primary | ICD-10-CM

## 2025-01-29 LAB
ALBUMIN SERPL-MCNC: 4.2 G/DL (ref 3.5–5)
ALBUMIN/GLOB SERPL: 1.4 (ref 1–1.9)
ALP SERPL-CCNC: 81 U/L (ref 40–129)
ALT SERPL-CCNC: 24 U/L (ref 8–55)
ANION GAP SERPL CALC-SCNC: 12 MMOL/L (ref 7–16)
AST SERPL-CCNC: 24 U/L (ref 15–37)
BASOPHILS # BLD: 0.05 K/UL (ref 0–0.2)
BASOPHILS NFR BLD: 0.4 % (ref 0–2)
BILIRUB SERPL-MCNC: 0.3 MG/DL (ref 0–1.2)
BUN SERPL-MCNC: 15 MG/DL (ref 6–23)
CALCIUM SERPL-MCNC: 9.1 MG/DL (ref 8.8–10.2)
CHLORIDE SERPL-SCNC: 105 MMOL/L (ref 98–107)
CO2 SERPL-SCNC: 21 MMOL/L (ref 20–29)
CREAT SERPL-MCNC: 0.99 MG/DL (ref 0.8–1.3)
DIFFERENTIAL METHOD BLD: ABNORMAL
EOSINOPHIL # BLD: 0.08 K/UL (ref 0–0.8)
EOSINOPHIL NFR BLD: 0.7 % (ref 0.5–7.8)
ERYTHROCYTE [DISTWIDTH] IN BLOOD BY AUTOMATED COUNT: 12.4 % (ref 11.9–14.6)
GLOBULIN SER CALC-MCNC: 3 G/DL (ref 2.3–3.5)
GLUCOSE SERPL-MCNC: 87 MG/DL (ref 70–99)
HCT VFR BLD AUTO: 40.9 % (ref 41.1–50.3)
HGB BLD-MCNC: 14.3 G/DL (ref 13.6–17.2)
IMM GRANULOCYTES # BLD AUTO: 0.03 K/UL (ref 0–0.5)
IMM GRANULOCYTES NFR BLD AUTO: 0.3 % (ref 0–5)
INR PPP: 1
LYMPHOCYTES # BLD: 1.76 K/UL (ref 0.5–4.6)
LYMPHOCYTES NFR BLD: 15.4 % (ref 13–44)
MCH RBC QN AUTO: 31.1 PG (ref 26.1–32.9)
MCHC RBC AUTO-ENTMCNC: 35 G/DL (ref 31.4–35)
MCV RBC AUTO: 88.9 FL (ref 82–102)
MONOCYTES # BLD: 0.67 K/UL (ref 0.1–1.3)
MONOCYTES NFR BLD: 5.9 % (ref 4–12)
NEUTS SEG # BLD: 8.85 K/UL (ref 1.7–8.2)
NEUTS SEG NFR BLD: 77.3 % (ref 43–78)
NRBC # BLD: 0 K/UL (ref 0–0.2)
PLATELET # BLD AUTO: 258 K/UL (ref 150–450)
PMV BLD AUTO: 9.5 FL (ref 9.4–12.3)
POTASSIUM SERPL-SCNC: 3.7 MMOL/L (ref 3.5–5.1)
PROCALCITONIN SERPL-MCNC: <0.02 NG/ML (ref 0–0.1)
PROT SERPL-MCNC: 7.2 G/DL (ref 6.3–8.2)
PROTHROMBIN TIME: 14.3 SEC (ref 11.3–14.9)
RBC # BLD AUTO: 4.6 M/UL (ref 4.23–5.6)
SODIUM SERPL-SCNC: 138 MMOL/L (ref 136–145)
WBC # BLD AUTO: 11.4 K/UL (ref 4.3–11.1)

## 2025-01-29 PROCEDURE — 80053 COMPREHEN METABOLIC PANEL: CPT

## 2025-01-29 PROCEDURE — 71046 X-RAY EXAM CHEST 2 VIEWS: CPT

## 2025-01-29 PROCEDURE — 99284 EMERGENCY DEPT VISIT MOD MDM: CPT

## 2025-01-29 PROCEDURE — 94640 AIRWAY INHALATION TREATMENT: CPT

## 2025-01-29 PROCEDURE — 85610 PROTHROMBIN TIME: CPT

## 2025-01-29 PROCEDURE — 84145 PROCALCITONIN (PCT): CPT

## 2025-01-29 PROCEDURE — 6370000000 HC RX 637 (ALT 250 FOR IP): Performed by: EMERGENCY MEDICINE

## 2025-01-29 PROCEDURE — 85025 COMPLETE CBC W/AUTO DIFF WBC: CPT

## 2025-01-29 RX ORDER — ALBUTEROL SULFATE 90 UG/1
2 INHALANT RESPIRATORY (INHALATION) EVERY 6 HOURS PRN
Qty: 18 G | Refills: 3 | Status: SHIPPED | OUTPATIENT
Start: 2025-01-29

## 2025-01-29 RX ORDER — BENZONATATE 200 MG/1
200 CAPSULE ORAL 3 TIMES DAILY PRN
Qty: 21 CAPSULE | Refills: 0 | Status: SHIPPED | OUTPATIENT
Start: 2025-01-29

## 2025-01-29 RX ORDER — IPRATROPIUM BROMIDE AND ALBUTEROL SULFATE 2.5; .5 MG/3ML; MG/3ML
1 SOLUTION RESPIRATORY (INHALATION)
Status: COMPLETED | OUTPATIENT
Start: 2025-01-29 | End: 2025-01-29

## 2025-01-29 RX ADMIN — IPRATROPIUM BROMIDE AND ALBUTEROL SULFATE 1 DOSE: .5; 3 SOLUTION RESPIRATORY (INHALATION) at 20:21

## 2025-01-29 ASSESSMENT — PAIN - FUNCTIONAL ASSESSMENT: PAIN_FUNCTIONAL_ASSESSMENT: 0-10

## 2025-01-29 ASSESSMENT — PAIN SCALES - GENERAL: PAINLEVEL_OUTOF10: 0

## 2025-01-29 NOTE — ED TRIAGE NOTES
Pt brought in by EMS from parking lot c/o multiple episodes of coughing up small amounts of bright red blood. Pt reports the home he's lived in four 6 yrs has black mold. Pt denies ETOH use , abdominal pain, no blood in stool and has not left the country. Pt denies prior medical HX.

## 2025-01-30 DIAGNOSIS — R04.2 HEMOPTYSIS: Primary | ICD-10-CM

## 2025-01-30 NOTE — ED NOTES
Patient mobility status  with no difficulty.     I have reviewed discharge instructions with the patient.  The patient verbalized understanding.    Patient left ED via Discharge Method: ambulatory to Home with Significant Other.    Opportunity for questions and clarification provided.     Patient given 2 scripts.           Terri King RN  01/29/25 [id number]

## 2025-01-30 NOTE — DISCHARGE INSTRUCTIONS
Use inhlaer 2 puffs every 6 hours  1,200mg mucinex DM every 12 hours for a week.  Tessalon perles every 8 hours for cough  Delsym syrup over the counter for cough as well  Drink plenty of fluids

## 2025-01-30 NOTE — PROGRESS NOTES
Patient with recent hemoptysis leading to ER visit.  Continue hemoptysis at home.  Pulmonary consult

## 2025-01-30 NOTE — PROGRESS NOTES
01/29/25 1817   Oxygen Therapy/Pulse Ox   O2 Therapy Oxygen   O2 Device None (Room air)   Pulse 79   Respirations 17   SpO2 98 %     Pt instructed on correct use of spacer device with MDI .

## 2025-02-03 ASSESSMENT — ENCOUNTER SYMPTOMS
NAUSEA: 0
FACIAL SWELLING: 0
RHINORRHEA: 0
TROUBLE SWALLOWING: 0
COLOR CHANGE: 0
EYE REDNESS: 0
SHORTNESS OF BREATH: 1
SINUS PRESSURE: 0
COUGH: 1
BACK PAIN: 0
VOMITING: 0
EYE DISCHARGE: 0
SORE THROAT: 0
ABDOMINAL PAIN: 0

## 2025-02-03 NOTE — ED PROVIDER NOTES
and neck supple. No rigidity.   Skin:     General: Skin is warm and dry.      Coloration: Skin is not jaundiced or pale.   Neurological:      General: No focal deficit present.      Mental Status: He is alert and oriented to person, place, and time. Mental status is at baseline.      Gait: Gait normal.   Psychiatric:         Mood and Affect: Mood normal.         Behavior: Behavior normal.        Procedures     Procedures    Orders Placed This Encounter   Procedures    XR CHEST (2 VW)    CBC with Auto Differential    CMP    Protime-INR    Procalcitonin    Misc nursing order (specify)    Saline lock IV        Medications given during this emergency department visit:  Medications   ipratropium 0.5 mg-albuterol 2.5 mg (DUONEB) nebulizer solution 1 Dose (1 Dose Inhalation Given 1/29/25 2021)       Discharge Medication List as of 1/29/2025  8:33 PM        START taking these medications    Details   benzonatate (TESSALON) 200 MG capsule Take 1 capsule by mouth 3 times daily as needed for Cough, Disp-21 capsule, R-0Normal      albuterol sulfate HFA (PROVENTIL HFA) 108 (90 Base) MCG/ACT inhaler Inhale 2 puffs into the lungs every 6 hours as needed for Wheezing, Disp-18 g, R-3Normal              Past Medical History:   Diagnosis Date    ADHD (attention deficit hyperactivity disorder) [id number]    Diagnosed when young    Erectile dysfunction     GERD (gastroesophageal reflux disease)     Not sure when diagnosed    Hypothyroidism     Restless legs syndrome     Never diagnosed    Sleep apnea     Possible conditon        No past surgical history on file.     Social History     Socioeconomic History    Marital status: Single   Tobacco Use    Smoking status: Some Days     Current packs/day: 0.25     Average packs/day: 0.3 packs/day for 5.0 years (1.3 ttl pk-yrs)     Types: Cigarettes     Passive exposure: Past    Smokeless tobacco: Current    Tobacco comments:     Vape 100 percent of time only smoke at work   Substance and Sexual

## 2025-02-04 ENCOUNTER — TELEPHONE (OUTPATIENT)
Dept: PULMONOLOGY | Age: 35
End: 2025-02-04

## 2025-02-04 NOTE — TELEPHONE ENCOUNTER
Note:  Ref by Dr. Pranay Cox for Hemoptysis. CXR 1/29/25   requested to PACS CTA PE 1/31/25 IMPRESSION:     1. No acute pulmonary arterial thromboembolism.     2. Findings concerning for pneumonia of the right upper lobe with other areas of more subtle opacification of the bilateral lung zones.       I have spoken with patient.  He reports that he coughed up some blood starting last week. Care Everywhere updated and patient is currently admitted at [facility] under care of pulmonary.  He will call if he needs anything from [facility] post appointment.  Anita, to let you know.

## 2025-02-08 SDOH — ECONOMIC STABILITY: TRANSPORTATION INSECURITY
IN THE PAST 12 MONTHS, HAS THE LACK OF TRANSPORTATION KEPT YOU FROM MEDICAL APPOINTMENTS OR FROM GETTING MEDICATIONS?: NO

## 2025-02-08 SDOH — ECONOMIC STABILITY: FOOD INSECURITY: WITHIN THE PAST 12 MONTHS, YOU WORRIED THAT YOUR FOOD WOULD RUN OUT BEFORE YOU GOT MONEY TO BUY MORE.: NEVER TRUE

## 2025-02-08 SDOH — ECONOMIC STABILITY: INCOME INSECURITY: IN THE LAST 12 MONTHS, WAS THERE A TIME WHEN YOU WERE NOT ABLE TO PAY THE MORTGAGE OR RENT ON TIME?: NO

## 2025-02-08 SDOH — ECONOMIC STABILITY: FOOD INSECURITY: WITHIN THE PAST 12 MONTHS, THE FOOD YOU BOUGHT JUST DIDN'T LAST AND YOU DIDN'T HAVE MONEY TO GET MORE.: NEVER TRUE

## 2025-02-08 SDOH — ECONOMIC STABILITY: TRANSPORTATION INSECURITY
IN THE PAST 12 MONTHS, HAS LACK OF TRANSPORTATION KEPT YOU FROM MEETINGS, WORK, OR FROM GETTING THINGS NEEDED FOR DAILY LIVING?: NO

## 2025-02-11 ENCOUNTER — OFFICE VISIT (OUTPATIENT)
Dept: FAMILY MEDICINE CLINIC | Facility: CLINIC | Age: 35
End: 2025-02-11
Payer: COMMERCIAL

## 2025-02-11 VITALS
HEART RATE: 73 BPM | RESPIRATION RATE: 16 BRPM | WEIGHT: 159 LBS | OXYGEN SATURATION: 98 % | BODY MASS INDEX: 22.76 KG/M2 | SYSTOLIC BLOOD PRESSURE: 133 MMHG | HEIGHT: 70 IN | TEMPERATURE: 97.3 F | DIASTOLIC BLOOD PRESSURE: 84 MMHG

## 2025-02-11 DIAGNOSIS — R04.2 HEMOPTYSIS: Primary | ICD-10-CM

## 2025-02-11 DIAGNOSIS — F51.04 CHRONIC INSOMNIA: ICD-10-CM

## 2025-02-11 PROCEDURE — 99213 OFFICE O/P EST LOW 20 MIN: CPT | Performed by: FAMILY MEDICINE

## 2025-02-11 RX ORDER — ESZOPICLONE 2 MG/1
2 TABLET, FILM COATED ORAL NIGHTLY
Qty: 30 TABLET | Refills: 5 | Status: CANCELLED | OUTPATIENT
Start: 2025-02-11 | End: 2025-08-10

## 2025-02-11 NOTE — PROGRESS NOTES
Subjective:   Russ Alexandra is a 35 y.o. male presents today for recent hospitalization for hemoptysis.  He had another episode of hemoptysis on 2 - 2 - 25 and was found to have right upper lobe pneumonia on CTA.  On bronchoscopy the next day there was an active clot noted in the right upper lobe he was then transferred to [facility] for interventional radiology embolization of the right bronchial artery which was a success.  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  However he has follow-up appointment for pulmonology in [place] on March 19 at which time they will review another CT scan and follow-up on AFB, fungal, Legionella cultures pending from [facility].,HSV-1, CMV, PJP, VZV from [facility]   No data recorded      Allergies   Allergen Reactions    Dog Epithelium (Canis Lupus Familiaris) Other (See Comments)    Molds & Smuts Other (See Comments)     PMH, MEDS reviewed     Objective:   Blood pressure 133/84, pulse 73, temperature 97.3 °F (36.3 °C), resp. rate 16, height 1.778 m (5' 10\"), weight 72.1 kg (159 lb), SpO2 98%.  General- Pleasant and no distress  Psych- alert and oriented to person, place and time  Mood and affect are appropriate to situation  Musculoskeletal - Gait and station examination reveals mid-position with no abnormalities.  Neurological- grossly intact.   rrr s mrg.   bcta  Echymoses rle moving inferiorly, c/w procedure.  Assessment/Plan:     1. Hemoptysis    2. Chronic insomnia         1. Hemoptysis  2. Chronic insomnia    He is doing well on the nicotine patch.  We talked about transitioning from the patch to nicotine gum or lozenges in a few weeks.  He does have appointment follow-up pulmonary in March.  We talked about the normal history of the bruising in his right thigh.  He has completely quit vaping and has only had 1 cigarette since hospitalization and plans on quitting.  Explained the use

## 2025-02-12 ENCOUNTER — TELEPHONE (OUTPATIENT)
Dept: FAMILY MEDICINE CLINIC | Facility: CLINIC | Age: 35
End: 2025-02-12

## 2025-02-12 DIAGNOSIS — F51.01 PRIMARY INSOMNIA: ICD-10-CM

## 2025-02-12 RX ORDER — ESZOPICLONE 2 MG/1
2 TABLET, FILM COATED ORAL NIGHTLY
Qty: 15 TABLET | Refills: 1 | Status: CANCELLED | OUTPATIENT
Start: 2025-02-12 | End: 2026-02-12

## 2025-02-12 NOTE — TELEPHONE ENCOUNTER
Patient called stating that he was only able to  15 pills of his medication:     eszopiclone (LUNESTA) 2 MG TABS       He is requesting the remaining amount and his 30 day refill be changed and sent to:    [facility]   [address], [place] [address] [phone]      Please advise    LOV  2/11/2025  NOV 3/18/2025

## 2025-02-21 ENCOUNTER — TELEPHONE (OUTPATIENT)
Dept: FAMILY MEDICINE CLINIC | Facility: CLINIC | Age: 35
End: 2025-02-21

## 2025-02-21 NOTE — TELEPHONE ENCOUNTER
Medical Director Ihsan Sanchez, ALISON Green, called to advise that patients Acid Fast Culture came back positive. He just wanted to make provider aware in case he would like to order more testing. His number is [phone] and doesn't mind a call if there are questions.     Patient also called in regards to his cultures. He states the his TB was negative

## 2025-02-24 DIAGNOSIS — R04.2 HEMOPTYSIS: Primary | ICD-10-CM

## 2025-02-24 DIAGNOSIS — R05.3 CHRONIC COUGH: ICD-10-CM

## 2025-02-24 DIAGNOSIS — R89.9 ACID-FAST BACTERIA PRESENT: ICD-10-CM

## 2025-03-11 ENCOUNTER — TELEPHONE (OUTPATIENT)
Dept: FAMILY MEDICINE CLINIC | Facility: CLINIC | Age: 35
End: 2025-03-11

## 2025-03-11 DIAGNOSIS — F51.01 PRIMARY INSOMNIA: ICD-10-CM

## 2025-03-11 NOTE — TELEPHONE ENCOUNTER
Patient called in regards to his sleep medication. He states that since he is being told that he can't get the lunesta refilled, he is requesting to be put back on Trazadone.     Patient states that he has 2 pills left and 10 more to  of the lunesta.     LOV  2/11/2025  NOV None scheduled        Please advise        [id number] [place] [facility] [address] [facility] - P [phone] - F [phone]  [address] [facility] [address] [address]  Phone: [phone]  Fax: [phone]

## 2025-03-12 RX ORDER — TRAZODONE HYDROCHLORIDE 50 MG/1
TABLET ORAL
Qty: 135 TABLET | Refills: 3 | Status: SHIPPED | OUTPATIENT
Start: 2025-03-12

## 2025-03-26 ENCOUNTER — OFFICE VISIT (OUTPATIENT)
Dept: FAMILY MEDICINE CLINIC | Facility: CLINIC | Age: 35
End: 2025-03-26
Payer: COMMERCIAL

## 2025-03-26 VITALS
RESPIRATION RATE: 18 BRPM | HEIGHT: 70 IN | WEIGHT: 156 LBS | BODY MASS INDEX: 22.33 KG/M2 | DIASTOLIC BLOOD PRESSURE: 59 MMHG | TEMPERATURE: 97.8 F | HEART RATE: 70 BPM | SYSTOLIC BLOOD PRESSURE: 99 MMHG | OXYGEN SATURATION: 98 %

## 2025-03-26 DIAGNOSIS — F41.3 OTHER MIXED ANXIETY DISORDERS: Primary | ICD-10-CM

## 2025-03-26 DIAGNOSIS — F90.0 ATTENTION DEFICIT HYPERACTIVITY DISORDER (ADHD), PREDOMINANTLY INATTENTIVE TYPE: ICD-10-CM

## 2025-03-26 PROCEDURE — 99213 OFFICE O/P EST LOW 20 MIN: CPT | Performed by: FAMILY MEDICINE

## 2025-03-26 ASSESSMENT — PATIENT HEALTH QUESTIONNAIRE - PHQ9
SUM OF ALL RESPONSES TO PHQ QUESTIONS 1-9: 0
2. FEELING DOWN, DEPRESSED OR HOPELESS: NOT AT ALL
1. LITTLE INTEREST OR PLEASURE IN DOING THINGS: NOT AT ALL

## 2025-03-26 NOTE — PROGRESS NOTES
Subjective:   Russ Alexandra is a 35 y.o. male presents today for wanting to discuss problems with anxiety.  His wife accompanies him.  He is losing focus.  His wife is noted.  Pauses noted.  Not performing well at his job.  He works in a [employer] or not looking like they normally do.  He is forgetful there.  Forgetful at home.  As a child he had ADD.  He worries about things all the time obsessively worries about various things around the house and about his health and his wife confirms this.    Allergies   Allergen Reactions    Dog Epithelium (Canis Lupus Familiaris) Other (See Comments)    Molds & Smuts Other (See Comments)     PMH, MEDS reviewed     Objective:   Blood pressure (!) 99/59, pulse 70, temperature 97.8 °F (36.6 °C), resp. rate 18, height 1.778 m (5' 10\"), weight 70.8 kg (156 lb), SpO2 98%.  General- Pleasant and no distress  Psych- alert and oriented to person, place and time  Mood and affect are appropriate to situation  Musculoskeletal - Gait and station examination reveals mid-position with no abnormalities.  Neurological- grossly intact.   rrr s mrg.   bcta    Assessment/Plan:     1. Other mixed anxiety disorders    2. Attention deficit hyperactivity disorder (ADHD), predominantly inattentive type         1. Other mixed anxiety disorders  -     sertraline (ZOLOFT) 50 MG tablet; Take 1 tablet by mouth daily, Disp-30 tablet, R-5Normal  2. Attention deficit hyperactivity disorder (ADHD), predominantly inattentive type  -     Amb External Referral To Counseling Services    He may have ADD as a source of his anxiety.  Will get him over to achieve MD.  In the meantime lets get him covered with an SSRI.  50 mg Zoloft.  There is a possibility this may work and he still may have ADD.  Is also possible that with his ADD he may be able to come off the Zoloft but this may help a little bit in the meantime.  Followup:   Return 10-12 weeks recheck anxiet, focus.

## 2025-06-04 ENCOUNTER — OFFICE VISIT (OUTPATIENT)
Dept: FAMILY MEDICINE CLINIC | Facility: CLINIC | Age: 35
End: 2025-06-04
Payer: COMMERCIAL

## 2025-06-04 VITALS
DIASTOLIC BLOOD PRESSURE: 80 MMHG | OXYGEN SATURATION: 97 % | HEART RATE: 80 BPM | BODY MASS INDEX: 22.19 KG/M2 | HEIGHT: 70 IN | SYSTOLIC BLOOD PRESSURE: 122 MMHG | TEMPERATURE: 97.8 F | WEIGHT: 155 LBS | RESPIRATION RATE: 18 BRPM

## 2025-06-04 DIAGNOSIS — F51.01 PRIMARY INSOMNIA: ICD-10-CM

## 2025-06-04 DIAGNOSIS — F41.3 OTHER MIXED ANXIETY DISORDERS: Primary | ICD-10-CM

## 2025-06-04 PROCEDURE — 99213 OFFICE O/P EST LOW 20 MIN: CPT | Performed by: FAMILY MEDICINE

## 2025-06-04 RX ORDER — TEMAZEPAM 15 MG/1
15 CAPSULE ORAL NIGHTLY PRN
Qty: 90 CAPSULE | Refills: 1 | Status: SHIPPED | OUTPATIENT
Start: 2025-06-04 | End: 2025-06-18

## 2025-06-04 RX ORDER — TRAZODONE HYDROCHLORIDE 50 MG/1
TABLET ORAL
Qty: 135 TABLET | Refills: 3 | Status: CANCELLED | OUTPATIENT
Start: 2025-06-04

## 2025-06-04 RX ORDER — SERTRALINE HYDROCHLORIDE 100 MG/1
100 TABLET, FILM COATED ORAL DAILY
Qty: 90 TABLET | Refills: 1 | Status: SHIPPED | OUTPATIENT
Start: 2025-06-04

## 2025-06-04 ASSESSMENT — PATIENT HEALTH QUESTIONNAIRE - PHQ9
SUM OF ALL RESPONSES TO PHQ QUESTIONS 1-9: 0
2. FEELING DOWN, DEPRESSED OR HOPELESS: NOT AT ALL
SUM OF ALL RESPONSES TO PHQ QUESTIONS 1-9: 0
1. LITTLE INTEREST OR PLEASURE IN DOING THINGS: NOT AT ALL
SUM OF ALL RESPONSES TO PHQ QUESTIONS 1-9: 0
SUM OF ALL RESPONSES TO PHQ QUESTIONS 1-9: 0

## 2025-06-04 NOTE — PROGRESS NOTES
Subjective:   Russ Alexandra is a 35 y.o. male presents today for recheck of anxiety and mood.  Was not able to see counselors at [facility] to consider ADD because it was a $500 upfront assessment and testing.  On the other hand he was able to begin the Zoloft 50 mg a day and it has helped very much and significantly with anxiety.  Perhaps not perfect but overall his focus and mood are better.    He was only able to obtain in the past 2 weeks of the Lunesta.  The trazodone only works mild to moderately for his sleep.  Most nights he does not sleep well and therefore he wakes up tired.  He is willing to try something different.  We talked about Ambien but I would rather try something like Restoril.  He has failed other products in the past.  PHQ-9 Total Score: 0 (6/4/2025  3:18 PM)      Allergies   Allergen Reactions    Dog Epithelium (Canis Lupus Familiaris) Other (See Comments)    Molds & Smuts Other (See Comments)     PMH, MEDS reviewed    Objective:   Blood pressure 122/80, pulse 80, temperature 97.8 °F (36.6 °C), resp. rate 18, height 1.778 m (5' 10\"), weight 70.3 kg (155 lb), SpO2 97%.  General- Pleasant and no distress  Psych- alert and oriented to person, place and time  Mood and affect are appropriate to situation  Musculoskeletal - Gait and station examination reveals mid-position with no abnormalities.  Neurological- grossly intact.   rrr s mrg.   bcta    Assessment/Plan:     1. Other mixed anxiety disorders    2. Primary insomnia         1. Other mixed anxiety disorders  -     sertraline (ZOLOFT) 100 MG tablet; Take 1 tablet by mouth daily, Disp-90 tablet, R-1Normal  2. Primary insomnia  -     temazepam (RESTORIL) 15 MG capsule; Take 1 capsule by mouth nightly as needed for Sleep for up to 14 days. Max Daily Amount: 15 mg, Disp-90 capsule, R-1Normal    Trial of Zoloft increased from 50 to 100 mg.  If seems to work even better than that great if not or he has side effects he can always cut it in

## 2025-06-09 DIAGNOSIS — F51.01 PRIMARY INSOMNIA: Primary | ICD-10-CM

## 2025-06-09 RX ORDER — MIRTAZAPINE 7.5 MG/1
7.5 TABLET, FILM COATED ORAL NIGHTLY
Qty: 30 TABLET | Refills: 5 | Status: SHIPPED | OUTPATIENT
Start: 2025-06-09